# Patient Record
Sex: FEMALE | Race: WHITE
[De-identification: names, ages, dates, MRNs, and addresses within clinical notes are randomized per-mention and may not be internally consistent; named-entity substitution may affect disease eponyms.]

---

## 2018-10-23 ENCOUNTER — HOSPITAL ENCOUNTER (EMERGENCY)
Dept: HOSPITAL 59 - ER | Age: 38
Discharge: HOME | End: 2018-10-23
Payer: SELF-PAY

## 2018-10-23 DIAGNOSIS — M25.511: ICD-10-CM

## 2018-10-23 DIAGNOSIS — M79.18: Primary | ICD-10-CM

## 2018-10-23 PROCEDURE — 99282 EMERGENCY DEPT VISIT SF MDM: CPT

## 2018-10-23 NOTE — EMERGENCY DEPARTMENT RECORD
History of Present Illness





- General


Chief complaint: Pain


Stated complaint: R SHOULDER PAIN


Time Seen by Provider: 10/23/18 22:23


Source: Patient


Mode of Arrival: Ambulatory


Limitations: No limitations





- History of Present Illness


Initial comments: 





37 yo female presents to ED for evaluation of pain to the right medial scapular 

region x 1 week.  Patient reports that she woke up with her pain symptoms, 

denies injury, and denies numbness, tingling, or extremity weakness.  Patient 

has been taking Ibuprofen as needed for her pain symptoms without improvement.  

Patient denies health problems at her baseline.


MD Complaint: Other


Onset/Timin


-: Week(s)


Location: Right, Other (Scapular region)


History of Same: Yes


Consistency: Intermittent


Improves with: Rest


Worsens with: Other (movement of the right upper extremity)


Associated Symptoms: Denies other symptoms





- Related Data


 Previous Rx's











 Medication  Instructions  Recorded


 


Diazepam [Valium] 5 mg PO Q8H PRN #10 tab 10/23/18











 Allergies











Allergy/AdvReac Type Severity Reaction Status Date / Time


 


Penicillins AdvReac  VOMITING Verified 10/23/18 22:30














Review of Systems


Constitutional: Denies: Chills, Fever, Malaise, Night sweats


Eyes: Denies: Eye discharge, Eye pain


ENT: Denies: Congestion, Ear pain, Epistaxis


Respiratory: Denies: Cough, Dyspnea


Cardiovascular: Denies: Chest pain, Dyspnea on exertion


Endocrine: Denies: Fatigue, Heat or cold intolerance


Gastrointestinal: Denies: Abdominal pain, Nausea, Vomiting


Genitourinary: Denies: Incontinence, Retention


Musculoskeletal: Reports: Myalgia.  Denies: Arthralgia, Back pain, Gout


Skin: Denies: Bruising, Change in color


Neurological: Denies: Abnormal gait, Confusion, Headache, Seizure


Psychiatric: Denies: Anxiety


Hematological/Lymphatic: Denies: Anemia, Blood Clots





Physical Exam





- General


General Appearance: Alert, Oriented x3, Cooperative, Mild distress


Limitations: No limitations





- Head


Head exam: Atraumatic, Normocephalic, Normal inspection


Head exam detail: negative: Abrasion, Contusion, Gama's sign, General 

tenderness, Hematoma, Laceration





- Eye


Eye exam: Normal appearance.  negative: Conjunctival injection, Periorbital 

swelling, Periorbital tenderness, Scleral icterus





- ENT


Ear exam: negative: Auricular hematoma, Auricular trauma


Nasal Exam: negative: Active bleeding, Discharge, Dried blood, Foreign body


Mouth exam: negative: Drooling, Laceration, Muffled voice, Tongue elevation





- Neck


Neck exam: Normal inspection.  negative: Meningismus, Tenderness





- Respiratory


Respiratory exam: Normal lung sounds bilaterally.  negative: Rales, Respiratory 

distress, Rhonchi, Stridor





- Cardiovascular


Cardiovascular Exam: Regular rate, Normal rhythm, Normal heart sounds





- GI/Abdominal


GI/Abdominal exam: Soft.  negative: Rebound, Rigid, Tenderness





- Rectal


Rectal exam: Deferred





- 


 exam: Deferred





- Extremities


Extremities exam: Normal inspection, Tenderness (MIld TTP along the rhomboid 

muscle medial to the right scapula.).  negative: Calf tenderness, Pedal edema





- Back


Back exam: Denies: CVA tenderness (R), CVA tenderness (L)





- Neurological


Neurological exam: Alert, Normal gait, Oriented X3, Other ( strength 5/5 

and symmetric bilaterally).  negative: Motor sensory deficit





- Psychiatric


Psychiatric exam: Normal affect, Normal mood





- Skin


Skin exam: Normal color.  negative: Abrasion


Type of lesion: negative: abrasion





Course





- Reevaluation(s)


Reevaluation #1: 





10/23/18 22:36


Patient was seen and examined.


Pain is reproducible with palpation medial to the scapula c/w rhomboid strain.


Will treat with Valium as needed for pain/spasm in addition to the Ibuprofen 

that the patient is taking currently.





Disposition


Disposition: Discharge


Clinical Impression: 


 Rhomboid muscle pain





Disposition: Home, Self-Care


Condition: (2) Stable


Instructions:  Muscle Strain (ED)


Additional Instructions: 


Return to ED if your symptoms worsen or if you have any concerns.


Valium as directed.


Follow-up with your family doctor in 3-5 days as directed.


Prescriptions: 


Diazepam [Valium] 5 mg PO Q8H PRN #10 tab


 PRN Reason: Spasms


Forms:  Patient Portal Access


Time of Disposition: 22:31





Quality





- Quality Measures


Quality Measures: N/A





- Blood Pressure Screening


Does Patient Have Any of the Following: No


Blood Pressure Classification: Normal BP Reading


Systolic Measurement: 105


Diastolic Measurement: 79


Screening for High Blood Pressure: < Normal BP, F/U Not Required > []

## 2019-06-06 ENCOUNTER — HOSPITAL ENCOUNTER (EMERGENCY)
Dept: HOSPITAL 59 - ER | Age: 39
Discharge: HOME | End: 2019-06-06
Payer: SELF-PAY

## 2019-06-06 DIAGNOSIS — F17.210: ICD-10-CM

## 2019-06-06 DIAGNOSIS — J20.9: Primary | ICD-10-CM

## 2019-06-06 DIAGNOSIS — R06.2: ICD-10-CM

## 2019-06-06 PROCEDURE — 99282 EMERGENCY DEPT VISIT SF MDM: CPT

## 2019-06-06 NOTE — EMERGENCY DEPARTMENT RECORD
History of Present Illness





- General


Chief complaint: Cold


Stated complaint: COLD/WHEEZING


Time Seen by Provider: 19 19:25


Source: Patient


Mode of Arrival: Ambulatory


Limitations: No limitations





- History of Present Illness


Initial comments: 





37 yo female presents to ED for evaluation of cough, congestion, and wheezing 

symptoms that began 5 days ago.  Patient denies fever or productive cough 

symptoms, reports that she is a current smoker but denies history of asthma or 

COPD.  Patient denies health problems at her baseline.  


MD complaint: Other


Onset/Timin


-: Days(s)


Severity: Moderate


Consistency: Constant


Improves with: None


Worsens with: None


Associated Symptoms: Cough





- Related Data


                                  Previous Rx's











 Medication  Instructions  Recorded


 


Albuterol Sulfate [Proair Hfa] 1 - 2 puff IH .EVERY 4-6 HOURS PRN 19





 #1 inhaler 


 


Azithromycin [Zithromax] 250 mg PO DAILY #6 tab 19


 


Prednisone [Prednisone 20Mg] 20 mg PO BID #10 tab 19











                                    Allergies











Allergy/AdvReac Type Severity Reaction Status Date / Time


 


Penicillins AdvReac  VOMITING Verified 10/23/18 22:30














Review of Systems


Constitutional: Denies: Chills, Fever, Malaise, Night sweats


Eyes: Denies: Eye discharge, Eye pain


ENT: Reports: Congestion.  Denies: Ear pain, Epistaxis


Respiratory: Reports: Cough, Dyspnea, Wheezes


Cardiovascular: Reports: Dyspnea on exertion.  Denies: Chest pain


Endocrine: Denies: Fatigue, Heat or cold intolerance


Gastrointestinal: Denies: Abdominal pain, Nausea, Vomiting


Genitourinary: Denies: Incontinence, Retention


Musculoskeletal: Denies: Arthralgia, Back pain


Skin: Denies: Bruising, Change in color


Neurological: Denies: Abnormal gait, Confusion, Headache, Seizure


Psychiatric: Denies: Anxiety


Hematological/Lymphatic: Denies: Anemia, Blood Clots





Past Medical History





- SOCIAL HISTORY


Smoking Status: Light tobacco smoker (<10/day)


Drug Use: None





- RESPIRATORY


Hx Respiratory Disorders: No





- CARDIOVASCULAR


Hx Cardio Disorders: No





- NEURO


Hx Neuro Disorders: No





- GI


Hx GI Disorders: No





- 


Hx Genitourinary Disorders: No





- ENDOCRINE


Hx Endocrine Disorders: No





- MUSCULOSKELETAL


Hx Musculoskeletal Disorders: No





- PSYCH


Hx Psych Problems: No





- HEMATOLOGY/ONCOLOGY


Hx Hematology/Oncology Disorders: No





Family Medical History


Family Hx Comment (NOT TO BE USED IN PLACE OF ITEMS BELOW): denies





Physical Exam





- General


General Appearance: Alert, Oriented x3, Cooperative, Mild distress


Limitations: No limitations





- Head


Head exam: Atraumatic, Normocephalic, Normal inspection


Head exam detail: negative: Abrasion, Contusion, Gama's sign, General 

tenderness, Hematoma, Laceration





- Eye


Eye exam: Normal appearance.  negative: Conjunctival injection, Periorbital 

swelling, Periorbital tenderness, Scleral icterus





- ENT


Ear exam: negative: Auricular hematoma, Auricular trauma


Nasal Exam: negative: Active bleeding, Discharge, Dried blood, Foreign body


Mouth exam: negative: Drooling, Laceration, Muffled voice, Tongue elevation





- Neck


Neck exam: Normal inspection.  negative: Meningismus, Tenderness





- Respiratory


Respiratory exam: Decreased breath sounds, Wheezes.  negative: Rales, 

Respiratory distress, Rhonchi





- Cardiovascular


Cardiovascular Exam: Regular rate, Normal rhythm, Normal heart sounds





- GI/Abdominal


GI/Abdominal exam: Soft.  negative: Rebound, Rigid, Tenderness





- Rectal


Rectal exam: Deferred





- 


 exam: Deferred





- Extremities


Extremities exam: Normal inspection.  negative: Pedal edema, Tenderness





- Back


Back exam: Denies: CVA tenderness (R), CVA tenderness (L)





- Neurological


Neurological exam: Alert, Normal gait, Oriented X3





- Psychiatric


Psychiatric exam: Normal affect, Normal mood





- Skin


Skin exam: Normal color.  negative: Abrasion


Type of lesion: negative: abrasion





Course





- Reevaluation(s)


Reevaluation #1: 





19 19:40


Patient was seen and examined, findings appear c/w acute bronchitis vs. early 

pneumonia.


Will treat with albuterol, prednisone, and zithromax as directed.


Patient appears stable for discharge at this time.





Disposition


Disposition: Discharge


Clinical Impression: 


 Bronchitis





Disposition: Home, Self-Care


Condition: (2) Stable


Instructions:  Acute Bronchitis (ED)


Additional Instructions: 


Return to ED if your symptoms worsen or if you have any concerns.


Albuterol, Prednisone, and Zithromax as directed.


Follow-up with your family doctor in 3-5 days as directed.


Prescriptions: 


Prednisone [Prednisone 20Mg] 20 mg PO BID #10 tab


Albuterol Sulfate [Proair Hfa] 1 - 2 puff IH .EVERY 4-6 HOURS PRN #1 inhaler


 PRN Reason: Difficulty In Breathing


Azithromycin [Zithromax] 250 mg PO DAILY #6 tab


Forms:  Patient Portal Access


Time of Disposition: 19:35





Quality





- Quality Measures


Quality Measures: N/A





- Blood Pressure Screening


Does Patient Have Any of the Following: No


Blood Pressure Classification: Normal BP Reading


Systolic Measurement: 119


Diastolic Measurement: 76


Screening for High Blood Pressure: < Normal BP, F/U Not Required > []